# Patient Record
(demographics unavailable — no encounter records)

---

## 2024-10-29 NOTE — ASSESSMENT
[FreeTextEntry1] :  Assessment/Plan: #1 Enlarged papillae of tongue #2 Globus  Blood pressure was high at today's visit, told to follow up with PCP. He is on BP medication already and states he has white coat syndrome. Discussed not brushing his tongue as hard and trying to avoid spicy foods since it seems to irritate them as well. Was offered GERD meds. Recommended observation.

## 2024-10-29 NOTE — HISTORY OF PRESENT ILLNESS
[de-identified] : KARINA OMALLEY is a 51 year old male who presents to the [facility] for follow up of his subjective right neck swelling. I last saw the patient on 8/23/24. At last visit I recommended massage, PT, and warm compresses. He was to follow up as needed. Today states for 20 days he has soreness on the back right of tongue and right side of tongue and felt a bump. Started after he ate spicy food at dinner. Denies fevers/chills, cough, dysphagia, odynophagia, and throat pain. States he uses a tongue  and brushes his tongue. No hx of reflux.  Neck US performed on 8/20/24 showed: No significant neck adenopathy. No free fluid or fluid collections. No definite masses. Thyroid gland appears unremarkable.  Previously reported: chief complaint right neck swelling starting on 7/10/24. He is referred by self. They have NOT had a prior CT neck. They do NOT have prior smoking history. History of chewing tobacco for about 8-10 years but stopped last year 5/2023. They do NOT prior history of alcoholism/alcohol abuse. They do NOT weight loss in the past 3 months. They do NOT have frequent ear pain. They have not a prior swallow study in the past 1 year. They have seen the following types of providers for this problem: Two doctors--one in [place]. They have taken the following medications for this problem: Cefdinir, Prednisone, Flexon(Anti-Inflammatory given in [place]) and Montek BL (Allergy medication given in [place]). Recently had a throat swab done by Dr. Jeromy Celestin on 7/20/2024 Cytology came back Negative for Malignant cells. Patient denies current throat pain, globus sensation, dysphagia, odynophagia, dyspnea, dysphonia, hemoptysis, otalgia, recent fevers or infections. Denies use of over the counter antacids or reflux medications. Patient's blood pressure was on 171/105. Patient was advised to follow up with PCP for high blood pressure. Denies chest pain, numbness, tingling, lightheadedness. Patient states he had "white coat syndrome". [place] in [place] and put on Cefdinir and 4 day course of prednisone. No change in symptoms.  Outside US done in [place] 7/21/2024 with outside report: Few cervical lymph nodes largest measuring 1.3cm.

## 2024-10-29 NOTE — PROCEDURE
[de-identified] : Procedure: Transnasal flexible laryngoscopy   Description: Informed consent was obtained from the patient prior to the procedure. The patient was seated in the clinic chair. Topical anesthesia was achieved by first spraying the nasal cavities with 4% lidocaine and 1% phenylephrine.   Exam: This demonstrates a clear vallecula and crisp epiglottis. The aryepiglottic folds are intact and symmetric bilaterally. The hypopharynx does not demonstrate pooling. The interarytenoid space demonstrates no lesions. It does not demonstrate pachydermia. The false vocal folds are symmetric and without lesions or masses. False fold voicing (plica ventricularis) is not noted today. The true vocal folds show normal and symmetric motion bilaterally. There is no paradoxical motion. The right medial edge is crisp and shows no lesions or masses. The left medial edge is crisp and shows no lesions or masses. The mucosal covering is minimally edematous. There is not erythema present today. There are no obvious vascular ectasias present. The vocal processes do not demonstrate granulomas. The subglottis and proximal trachea is clear and unobstructed to the limits of the examination today.

## 2024-10-29 NOTE — HISTORY OF PRESENT ILLNESS
[de-identified] : KARINA OMALLEY is a 51 year old male who presents to the [facility] for follow up of his subjective right neck swelling. I last saw the patient on 8/23/24. At last visit I recommended massage, PT, and warm compresses. He was to follow up as needed. Today states for 20 days he has soreness on the back right of tongue and right side of tongue and felt a bump. Started after he ate spicy food at dinner. Denies fevers/chills, cough, dysphagia, odynophagia, and throat pain. States he uses a tongue  and brushes his tongue. No hx of reflux.  Neck US performed on 8/20/24 showed: No significant neck adenopathy. No free fluid or fluid collections. No definite masses. Thyroid gland appears unremarkable.  Previously reported: chief complaint right neck swelling starting on 7/10/24. He is referred by self. They have NOT had a prior CT neck. They do NOT have prior smoking history. History of chewing tobacco for about 8-10 years but stopped last year 5/2023. They do NOT prior history of alcoholism/alcohol abuse. They do NOT weight loss in the past 3 months. They do NOT have frequent ear pain. They have not a prior swallow study in the past 1 year. They have seen the following types of providers for this problem: Two doctors--one in [place]. They have taken the following medications for this problem: Cefdinir, Prednisone, Flexon(Anti-Inflammatory given in [place]) and Montek BL (Allergy medication given in [place]). Recently had a throat swab done by Dr. Jeromy Celestin on 7/20/2024 Cytology came back Negative for Malignant cells. Patient denies current throat pain, globus sensation, dysphagia, odynophagia, dyspnea, dysphonia, hemoptysis, otalgia, recent fevers or infections. Denies use of over the counter antacids or reflux medications. Patient's blood pressure was on 171/105. Patient was advised to follow up with PCP for high blood pressure. Denies chest pain, numbness, tingling, lightheadedness. Patient states he had "white coat syndrome". [place] in [place] and put on Cefdinir and 4 day course of prednisone. No change in symptoms.  Outside US done in [place] 7/21/2024 with outside report: Few cervical lymph nodes largest measuring 1.3cm.

## 2024-10-29 NOTE — PROCEDURE
[de-identified] : Procedure: Transnasal flexible laryngoscopy   Description: Informed consent was obtained from the patient prior to the procedure. The patient was seated in the clinic chair. Topical anesthesia was achieved by first spraying the nasal cavities with 4% lidocaine and 1% phenylephrine.   Exam: This demonstrates a clear vallecula and crisp epiglottis. The aryepiglottic folds are intact and symmetric bilaterally. The hypopharynx does not demonstrate pooling. The interarytenoid space demonstrates no lesions. It does not demonstrate pachydermia. The false vocal folds are symmetric and without lesions or masses. False fold voicing (plica ventricularis) is not noted today. The true vocal folds show normal and symmetric motion bilaterally. There is no paradoxical motion. The right medial edge is crisp and shows no lesions or masses. The left medial edge is crisp and shows no lesions or masses. The mucosal covering is minimally edematous. There is not erythema present today. There are no obvious vascular ectasias present. The vocal processes do not demonstrate granulomas. The subglottis and proximal trachea is clear and unobstructed to the limits of the examination today.

## 2024-12-11 NOTE — HISTORY OF PRESENT ILLNESS
[FreeTextEntry1] : The patient is a 51-year-old male with past medical history significant for hypertension who was referred to my office by Dr. Eddie Coronado for abdominal pain and dyspepsia. The patient also came to the office for evaluation for colon cancer screening.  I was asked to render an opinion for consultation for the above complaints.   The patient states that he is feeling fine.  The patient complains of occasional abdominal pain.  The patient describes the abdominal pain as a crampy, intermittent diffuse abdominal discomfort that is nonradiating in nature.  The abdominal pain is unrelated to stress.  The abdominal pain is worse with meals (baking soda) and improves with passing gas or having a bowel movement.  The abdominal pain is described as mild to moderate in nature.  The abdominal pain occurs at night and in the morning.  The abdominal pain can occur at any time.   The abdominal pain never has awakened the patient from sleep. The abdominal pain is not relieved with any medications.  The abdominal pain is associated with abdominal gas and bloating.  The patient denies any nausea or vomiting.  The patient denies any gastroesophageal reflux disease or dysphagia. The patient denies any atypical chest pain, shortness of breath or palpitations.  The patient denies any diaphoresis. The patient denies any constipation or diarrhea.  The patient has 1 bowel movement a day. The patient denies a change in bowel habits.  The patient denies a change in caliber of stool.  The patient denies having mucus discharge with the bowel movements.  The patient denies any bright red blood per rectum, melena or hematemesis.  The patient denies any rectal pain or rectal pruritus. The patient denies any weight loss or anorexia. He denies any fevers or chills.  The patient denies any jaundice or pruritus.  The patient complains of chronic lower back pain.  The patient denies ever having a prior upper endoscopy and colonoscopy performed by another gastroenterologist.  The patient denies any significant family history of GI problems.    (-) smoking, (-) ETOH, (-) IVDA  Physical Exam: General Appearance: Well developed, well nourished, no acute distress ENT:  nose clear, ears unremarkable Eyes: No enteric sclera, conjunctiva clear. Neck: Supple, without masses  Respiratory: Breath sounds equal and bilateral, no wheezing no rales or rhonchi Cardiovascular: S1-S2 audible, no murmur, no rubs or gallops GI: (+) BS, soft, nontender, no rebound, no guarding, no masses Liver: liver edge palpated Rectal: not done Musculo-skeletal: Good motor strength, good range of motion, normal appearing extremities Skin: Normal appearing skin, no jaundice, no rashes or nodules Neurological: without focal motor or sensory deficits Patient is moving all extremities spontaneously and to command with normal muscle strength alert and oriented X3 Psychiatric: Good affect, not depressed, not anxious

## 2024-12-11 NOTE — ASSESSMENT
[FreeTextEntry1] : Abdominal Pain: The patient complains of abdominal pain. The patient is to avoid nonsteroidal anti-inflammatory drugs and aspirin.  I recommend a trial of Phazyme 1 tablet PO 3 times a day for 3 months for the symptoms. Dyspepsia: The patient complains of dyspeptic symptoms.  The patient was advised to abide by an anti-gas (low FOD-MAP) diet.  The patient was given a pamphlet for anti-gas (low FOD-MAP).  The patient and I reviewed the anti-gas (low FOD-MAP) diet at length. The patient is to start on a trial of Simethicone one tablet 4 times a day p.r.n. abdominal pain and gas. Colon Cancer screening: I recommend colonoscopy for colon cancer screening over the age of 45 to assess for colonic polyps. The patient was told of the risks and benefits of the procedure.  The patient was told of the risks of perforation, emergency surgery, bleeding, infections and missed lesions. The patient is to be on a clear liquid diet the entire day prior to the procedure. The patient is to complete the entire prescribed bowel prep the day prior to the procedure as directed. The patient is told not to drive, drink alcohol, use recreational drugs, exercise, or work the day of the procedure.  The patient was told of the need for an escort to accompany the patient home after the procedure. The patient is aware that the procedure may be cancelled if they fail to follow the directions.  The patient agreed and will schedule for the procedure. The patient can take the antihypertensive medication with a sip of water one hour prior to the procedure. The patient is to be n.p.o. after midnight and bowel prep was given.  The patient is to return for the procedure. Colonoscopy: I recommend a colonoscopy to assess the symptoms and for colonic polyps.  The patient was told of the risks and benefits of the procedure.  The patient was told of the risks of perforation, emergency surgery, bleeding, infections and missed lesions.  The patient is to be on a clear liquid diet the entire day prior to the procedure. The patient is to complete the entire prescribed bowel prep the day prior to the procedure as directed. The patient is told not to drive, drink alcohol, use recreational drugs, exercise, or work the day of the procedure.  The patient was told of the need for an escort to accompany the patient home after the procedure. The patient is aware that the procedure may be cancelled if they fail to follow the directions.  The patient agreed and will schedule for the procedure. The patient can take the antihypertensive medication with a sip of water one hour prior to the procedure. The patient is to be n.p.o. after midnight and bowel prep was given.  The patient is to return for the procedure. Follow-up: The patient is to follow-up in the office in 4 weeks to reassess the symptoms. The patient was told to call the office if any further problems.

## 2024-12-18 NOTE — ASSESSMENT
[FreeTextEntry1] : Abdominal Pain: The patient complains of abdominal pain. The patient is to avoid nonsteroidal anti-inflammatory drugs and aspirin.  I recommend a low FOD-MAP diet.  I recommend a trial of Dicyclomine 10 mg tablet PO 3 times a day PRN for the abdominal pain.  I recommend a trial of Pantoprazole 40 mg once a day for 3 months for the symptoms.   Dyspepsia: The patient complains of dyspeptic symptoms.  The patient was advised to continue to abide by an anti-gas (low FOD-MAP) diet.  The patient was previously given a pamphlet for anti-gas (low FOD-MAP).  The patient and I reviewed the anti-gas (low FOD-MAP) diet at length again. The patient is to continue on a trial of Simethicone one tablet 4 times a day p.r.n. abdominal pain and gas. Upper Endoscopy: I recommend an upper endoscopy to assess for peptic ulcer disease versus esophagitis.  The patient was told of the risks and benefits of the procedure.  The patient was told of the risks of perforation, emergency surgery, bleeding, infections and missed lesions. The patient is told not to drive, drink alcohol, use recreational drugs, exercise, or work the day of the procedure.  The patient was told of the need for an escort to accompany the patient home after the procedure. The patient is aware that the procedure may be cancelled if they fail to follow the directions.  The patient agreed and will schedule for the procedure. The patient is to be n.p.o. after midnight.  The patient is to return for the procedure. Colon Cancer screening: I recommend colonoscopy for colon cancer screening over the age of 45 to assess for colonic polyps. The patient was told of the risks and benefits of the procedure. The patient was told of the risks of perforation, emergency surgery, bleeding, infections and missed lesions. The patient is to be on a clear liquid diet the entire day prior to the procedure. The patient is to complete the entire prescribed bowel prep the day prior to the procedure as directed. The patient is told not to drive, drink alcohol, use recreational drugs, exercise, or work the day of the procedure. The patient was told of the need for an escort to accompany the patient home after the procedure. The patient is aware that the procedure may be cancelled if they fail to follow the directions. The patient agreed and will schedule for the procedure. The patient can take the antihypertensive medication with a sip of water one hour prior to the procedure. The patient is to be n.p.o. after midnight and bowel prep was given. The patient is to return for the procedure. Colonoscopy: I recommend a colonoscopy to assess the symptoms and for colonic polyps. The patient was told of the risks and benefits of the procedure. The patient was told of the risks of perforation, emergency surgery, bleeding, infections and missed lesions. The patient is to be on a clear liquid diet the entire day prior to the procedure. The patient is to complete the entire prescribed bowel prep the day prior to the procedure as directed. The patient is told not to drive, drink alcohol, use recreational drugs, exercise, or work the day of the procedure. The patient was told of the need for an escort to accompany the patient home after the procedure. The patient is aware that the procedure may be cancelled if they fail to follow the directions. The patient agreed and will schedule for the procedure. The patient can take the antihypertensive medication with a sip of water one hour prior to the procedure. The patient is to be n.p.o. after midnight and bowel prep was given. The patient is to return for the procedure. Follow-up: The patient is to follow-up in the office in 4 weeks to reassess the symptoms. The patient was told to call the office if any further problems.

## 2024-12-18 NOTE — HISTORY OF PRESENT ILLNESS
[FreeTextEntry1] : The patient has a past medical history significant for hypertension.  The patient is being followed by Dr. Eddie Coronado. The patient also came to the office for evaluation for colon cancer screening. The patient states that he is feeling uncomfortable. The patient denies any jaundice or pruritus.  The patient denies any complains of chronic lower back pain. The patient complains of abdominal pain.  The patient describes the abdominal pain as a sharp, pressure, intermittent midepigastric and left upper quadrant discomfort that is nonradiating in nature.  The abdominal pain is worse with stress and with movements.  The abdominal pain is worse with meals and improves with passing gas or having a bowel movement.  The abdominal pain is described as mild to moderate in nature.  The abdominal pain occurs at night and in the morning.  The abdominal pain can occur at any time.   The abdominal pain has never awakened the patient from sleep.  The abdominal pain is not relieved with medication.  The abdominal pain is associated with abdominal gas and bloating.  The patient denies any nausea or vomiting.  The patient denies any gastroesophageal reflux disease or dysphagia.  The patient denies any atypical chest pain, shortness of breath or palpitations.  The patient denies any diaphoresis. The patient complains of occasional diarrhea but denies any constipation.  The patient has 1 bowel movement a day. The diarrhea is described as soft with a little watery in nature.   The patient complains of a change in bowel habits.  The patient complains of a change in caliber of stool.   The patient denies having mucus discharge with the bowel movements.  The patient denies any bright red blood per rectum, melena or hematemesis.  The patient denies any rectal pain or rectal pruritus.  The patient denies any weight loss or anorexia.  He denies any fevers or chills.  The patient denies any significant family history of GI problems.  (-) smoking, (-) ETOH, (-) IVDA  Physical Exam: General Appearance: no acute distress ENT: nose clear, ears unremarkable Eyes: No enteric sclera, conjunctiva clear. Neck: Supple, without masses Respiratory: Breath sounds equal and bilateral, no wheezing no rales or rhonchi Cardiovascular: S1-S2 audible, no murmur, no rubs or gallops GI: (+) BS, soft, nontender, no rebound, no guarding, no masses Liver: liver edge palpated Musculo-skeletal: Good motor strength, good range of motion, normal appearing extremities Skin: Normal appearing skin, no jaundice, no rashes or nodules Neurological: without focal motor or sensory deficits Patient is moving all extremities spontaneously and to command with normal muscle strength alert and oriented X3 Psychiatric: Good affect, not depressed, (+) anxious

## 2025-03-03 NOTE — HISTORY OF PRESENT ILLNESS
[de-identified] :  The upper endoscopy performed at the [facility] GI endoscopy suite on February 4, 2025 revealed a small hiatal hernia and mild diffuse gastritis. The gastric pathology performed on February 4, 2025 revealed esophageal mucosa with unremarkable squamous mucosa (esophagus), gastric antrum and body mucosa with chronic mild gastritis that was negative for Helicobacter pylori (antrum and body of the stomach) and duodenal mucosa without significant pathology (duodenum).  [FreeTextEntry1] : The colonoscopy to the terminal ileum performed at the [facility] GI endoscopy suite on February 4, 2025 revealed a transverse colon polyp and small internal hemorrhoids.  The colonic polyp was snared and removed.  There was no bleeding post polypectomy. There were no other polyps, masses, diverticulosis, AVMs or colitis noted.  The colonic pathology performed on February 4, 2025 revealed a tubular adenoma (transverse colon polyp at 65 cm).

## 2025-03-03 NOTE — ASSESSMENT
[FreeTextEntry1] : Hiatal Hernia:  The patient has a history of a hiatal hernia noted on prior upper endoscopy. The patient was advised to avoid late-night meals and dietary indiscretions.  The patient was advised to avoid fried and fatty foods.  The patient was advised to abide by an anti-GERD diet. The patient was given a pamphlet for anti-GERD.  The patient and I reviewed the anti-GERD diet at length. I recommend a trial of Pantoprazole 40 mg once a day for 3 months for the symptoms. Gastritis: The patient has a history of gastritis noted on prior upper endoscopy. The patient is to avoid nonsteroidal anti-inflammatory drugs and aspirin. I recommend a trial of pantoprazole 40 mg once a day for 3 months for the symptoms.  Colonic Polyp: The patient was found to have colonic polyps on prior colonoscopy.  I recommend a repeat colonoscopy in 5 years to reassess for colonic polyps pending patient's health unless symptomatic.  The patient agreed and will follow up for the procedure.  Internal Hemorrhoids: The patient is to consider starting a trial of Anusol H. C. suppositories one per rectum nightly and Anusol HC2 .5% cream apply to affected area twice a day p.r.n. hemorrhoidal bleeding or pain. I also recommend a trial of Calmoseptine cream apply to affected area twice a day for hemorrhoidal discomfort.  I recommend Tucks pads for the hemorrhoids.  I recommend Sitz baths twice a day for the hemorrhoids.  I recommend avoid wearing tight underwear and use boxers.  I recommend avoid touching the perineal area.  The patient agreed to followup.  I recommend a repeat colonoscopy in 5 years to reassess for colonic polyps unless symptomatic.  The patient agreed and will follow up for the procedure.  Follow-up: The patient is to follow-up in the office in 1 year to reassess the symptoms. The patient was told to call the office if any further problems.

## 2025-06-05 NOTE — HEALTH RISK ASSESSMENT
[Good] : ~his/her~  mood as  good [No] : In the past 12 months have you used drugs other than those required for medical reasons? No [No falls in past year] : Patient reported no falls in the past year [0] : 2) Feeling down, depressed, or hopeless: Not at all (0) [Former] : Former [NO] : No [Patient reported colonoscopy was normal] : Patient reported colonoscopy was normal [HIV test declined] : HIV test declined [None] : None [Feels Safe at Home] : Feels safe at home [Fully functional (bathing, dressing, toileting, transferring, walking, feeding)] : Fully functional (bathing, dressing, toileting, transferring, walking, feeding) [Fully functional (using the telephone, shopping, preparing meals, housekeeping, doing laundry, using] : Fully functional and needs no help or supervision to perform IADLs (using the telephone, shopping, preparing meals, housekeeping, doing laundry, using transportation, managing medications and managing finances) [Smoke Detector] : smoke detector [Carbon Monoxide Detector] : carbon monoxide detector [Seat Belt] :  uses seat belt [Sunscreen] : uses sunscreen [With Patient/Caregiver] : , with patient/caregiver [FreeTextEntry1] : Check up\par   [de-identified] : GI [[id number]] : 0 [Change in mental status noted] : No change in mental status noted [Reports changes in hearing] : Reports no changes in hearing [Reports changes in vision] : Reports no changes in vision [Reports changes in dental health] : Reports no changes in dental health [ColonoscopyDate] : 02/25 [HepatitisCDate] : 05/25 [HepatitisCComments] : Negative [AdvancecareDate] : 06/25

## 2025-06-05 NOTE — ASSESSMENT
[FreeTextEntry1] : 52 year old male found to have stable Elevated Hemoglobin A1c, Hypercholesterolemia, Vitamin D Deficiency, intermittently symptomatic Chronic Back pain, with the current prescription regimen as recommended, diet and lifestyle modifications, as counseled. Prior results reviewed, interpreted and discussed with the patient during today's examination, as appropriate. Follow up, treatment plan and tests, as ordered.   EKG: Sinus Rhythm @ 67 BPM. No acute ST-T changes noted.

## 2025-06-05 NOTE — HISTORY OF PRESENT ILLNESS
[de-identified] : 52 year old male patient with history of stable Elevated Hemoglobin A1c, Hypercholesterolemia, Vitamin D Deficiency, intermittently symptomatic Chronic Back pain, history as stated, presented for an annual preventative examination.

## 2025-06-05 NOTE — HEALTH RISK ASSESSMENT
[Good] : ~his/her~  mood as  good [No] : In the past 12 months have you used drugs other than those required for medical reasons? No [No falls in past year] : Patient reported no falls in the past year [0] : 2) Feeling down, depressed, or hopeless: Not at all (0) [Former] : Former [NO] : No [Patient reported colonoscopy was normal] : Patient reported colonoscopy was normal [HIV test declined] : HIV test declined [None] : None [Feels Safe at Home] : Feels safe at home [Fully functional (bathing, dressing, toileting, transferring, walking, feeding)] : Fully functional (bathing, dressing, toileting, transferring, walking, feeding) [Fully functional (using the telephone, shopping, preparing meals, housekeeping, doing laundry, using] : Fully functional and needs no help or supervision to perform IADLs (using the telephone, shopping, preparing meals, housekeeping, doing laundry, using transportation, managing medications and managing finances) [Smoke Detector] : smoke detector [Carbon Monoxide Detector] : carbon monoxide detector [Seat Belt] :  uses seat belt [Sunscreen] : uses sunscreen [With Patient/Caregiver] : , with patient/caregiver [FreeTextEntry1] : Check up\par   [de-identified] : GI [[id number]] : 0 [Change in mental status noted] : No change in mental status noted [Reports changes in hearing] : Reports no changes in hearing [Reports changes in vision] : Reports no changes in vision [Reports changes in dental health] : Reports no changes in dental health [ColonoscopyDate] : 02/25 [HepatitisCDate] : 05/25 [HepatitisCComments] : Negative [AdvancecareDate] : 06/25

## 2025-06-05 NOTE — HISTORY OF PRESENT ILLNESS
[de-identified] : 52 year old male patient with history of stable Elevated Hemoglobin A1c, Hypercholesterolemia, Vitamin D Deficiency, intermittently symptomatic Chronic Back pain, history as stated, presented for an annual preventative examination.

## 2025-06-05 NOTE — HISTORY OF PRESENT ILLNESS
[de-identified] : 52 year old male patient with history of stable Elevated Hemoglobin A1c, Hypercholesterolemia, Vitamin D Deficiency, intermittently symptomatic Chronic Back pain, history as stated, presented for an annual preventative examination.

## 2025-06-05 NOTE — HEALTH RISK ASSESSMENT
[Good] : ~his/her~  mood as  good [No] : In the past 12 months have you used drugs other than those required for medical reasons? No [No falls in past year] : Patient reported no falls in the past year [0] : 2) Feeling down, depressed, or hopeless: Not at all (0) [Former] : Former [NO] : No [Patient reported colonoscopy was normal] : Patient reported colonoscopy was normal [HIV test declined] : HIV test declined [None] : None [Feels Safe at Home] : Feels safe at home [Fully functional (bathing, dressing, toileting, transferring, walking, feeding)] : Fully functional (bathing, dressing, toileting, transferring, walking, feeding) [Fully functional (using the telephone, shopping, preparing meals, housekeeping, doing laundry, using] : Fully functional and needs no help or supervision to perform IADLs (using the telephone, shopping, preparing meals, housekeeping, doing laundry, using transportation, managing medications and managing finances) [Smoke Detector] : smoke detector [Carbon Monoxide Detector] : carbon monoxide detector [Seat Belt] :  uses seat belt [Sunscreen] : uses sunscreen [With Patient/Caregiver] : , with patient/caregiver [FreeTextEntry1] : Check up\par   [de-identified] : GI [[id number]] : 0 [Change in mental status noted] : No change in mental status noted [Reports changes in hearing] : Reports no changes in hearing [Reports changes in vision] : Reports no changes in vision [Reports changes in dental health] : Reports no changes in dental health [ColonoscopyDate] : 02/25 [HepatitisCDate] : 05/25 [HepatitisCComments] : Negative [AdvancecareDate] : 06/25